# Patient Record
Sex: FEMALE | Race: WHITE
[De-identification: names, ages, dates, MRNs, and addresses within clinical notes are randomized per-mention and may not be internally consistent; named-entity substitution may affect disease eponyms.]

---

## 2017-09-19 ENCOUNTER — HOSPITAL ENCOUNTER (EMERGENCY)
Dept: HOSPITAL 74 - JER | Age: 22
LOS: 1 days | Discharge: HOME | End: 2017-09-20
Payer: COMMERCIAL

## 2017-09-19 VITALS — BODY MASS INDEX: 28.1 KG/M2

## 2017-09-19 DIAGNOSIS — Z79.4: ICD-10-CM

## 2017-09-19 DIAGNOSIS — E10.9: ICD-10-CM

## 2017-09-19 DIAGNOSIS — L02.415: Primary | ICD-10-CM

## 2017-09-19 DIAGNOSIS — Z96.41: ICD-10-CM

## 2017-09-19 DIAGNOSIS — J45.909: ICD-10-CM

## 2017-09-20 VITALS — SYSTOLIC BLOOD PRESSURE: 116 MMHG | HEART RATE: 100 BPM | DIASTOLIC BLOOD PRESSURE: 73 MMHG

## 2017-09-20 VITALS — TEMPERATURE: 98.5 F

## 2017-09-20 LAB
ALBUMIN SERPL-MCNC: 3.5 G/DL (ref 3.4–5)
ALP SERPL-CCNC: 79 U/L (ref 45–117)
ALT SERPL-CCNC: 12 U/L (ref 12–78)
ANION GAP SERPL CALC-SCNC: 8 MMOL/L (ref 8–16)
AST SERPL-CCNC: 8 U/L (ref 15–37)
BASOPHILS # BLD: 0.5 % (ref 0–2)
BILIRUB SERPL-MCNC: 0.6 MG/DL (ref 0.2–1)
CALCIUM SERPL-MCNC: 8.7 MG/DL (ref 8.5–10.1)
CO2 SERPL-SCNC: 28 MMOL/L (ref 21–32)
CREAT SERPL-MCNC: 0.7 MG/DL (ref 0.55–1.02)
DEPRECATED RDW RBC AUTO: 12.9 % (ref 11.6–15.6)
EOSINOPHIL # BLD: 2.4 % (ref 0–4.5)
GLUCOSE SERPL-MCNC: 245 MG/DL (ref 74–106)
MCH RBC QN AUTO: 29.2 PG (ref 25.7–33.7)
MCHC RBC AUTO-ENTMCNC: 33.7 G/DL (ref 32–36)
MCV RBC: 86.7 FL (ref 80–96)
NEUTROPHILS # BLD: 72.9 % (ref 42.8–82.8)
PLATELET # BLD AUTO: 236 K/MM3 (ref 134–434)
PMV BLD: 9.7 FL (ref 7.5–11.1)
PROT SERPL-MCNC: 7.1 G/DL (ref 6.4–8.2)
WBC # BLD AUTO: 18.1 K/MM3 (ref 4–10)

## 2017-09-20 NOTE — PDOC
*Physical Exam





- Vital Signs


 Last Vital Signs











Temp Pulse Resp BP Pulse Ox


 


 98.5 F   100 H  18   116/73   100 


 


 09/20/17 03:45  09/20/17 01:05  09/20/17 01:05  09/20/17 01:05  09/20/17 01:05














ED Treatment Course





- LABORATORY


CBC & Chemistry Diagram: 


 09/20/17 01:30





 09/20/17 01:30





- ADDITIONAL ORDERS


Additional order review: 


 Laboratory  Results











  09/20/17





  01:30


 


Sodium  136


 


Potassium  3.9


 


Chloride  100


 


Carbon Dioxide  28


 


Anion Gap  8


 


BUN  8  D


 


Creatinine  0.7


 


Creat Clearance w eGFR  > 60


 


Random Glucose  245 H D


 


Calcium  8.7


 


Total Bilirubin  0.6  D


 


AST  8 L


 


ALT  12  D


 


Alkaline Phosphatase  79


 


Total Protein  7.1


 


Albumin  3.5








 











  09/20/17





  01:30


 


RBC  4.55


 


MCV  86.7


 


MCHC  33.7


 


RDW  12.9


 


MPV  9.7


 


Neutrophils %  72.9


 


Lymphocytes %  19.7


 


Monocytes %  4.5


 


Eosinophils %  2.4


 


Basophils %  0.5














- Medications


Given in the ED: 


ED Medications














Discontinued Medications














Generic Name Dose Route Start Last Admin





  Trade Name Freq  PRN Reason Stop Dose Admin


 


Piperacillin Sod/Tazobactam  50 mls @ 100 mls/hr 09/20/17 01:12 09/20/17 01:45





  Sod 3.375 gm/ Dextrose  IVPB 09/20/17 01:41  100 mls/hr





  ONCE ONE   Administration





  Protocol   


 


Trimethoprim/Sulfamethoxazole  1 each 09/20/17 01:12 09/20/17 01:45





  Bactrim Ds -  PO 09/20/17 01:13  1 each





  ONCE ONE   Administration














*DC/Admit/Observation/Transfer


Diagnosis at time of Disposition: 


 Abscess of right thigh





- Discharge Dispostion


Disposition: HOME


Condition at time of disposition: Stable


Admit: No





- Prescriptions


Prescriptions: 


Clindamycin [Cleocin -] 300 mg PO TID #30 capsule


Ibuprofen 800 mg PO TID #30 tablet


Oxycodone HCl/Acetaminophen [Percocet 5-325 mg Tablet] 1 - 2 tab PO Q6H #20 

tablet MDD 4





- Referrals


Referrals: 


Daja Lynch MD [Primary Care Provider] - 





- Patient Instructions


Printed Discharge Instructions:  DI for Wound Infection





- Post Discharge Activity


Work/School Note:  Back to Work

## 2017-09-20 NOTE — PDOC
History of Present Illness





- General


History Source: Patient


Exam Limitations: No Limitations





- History of Present Illness


Initial Comments: 





17 01:38


Patient is a 21 year old female with a significant past medical history of 

asthma and diabetes, who presents to the ED with complaining of right inner 

thigh pustule that began developing 3 days ago.  Patient report noticing the 

pustule on her right inner thigh 3 days ago with pain.  She reports 

intermittent drainage from the pustule.  Patient states she has been to the ED 

for this complaint once before.  





Denies fever, chills. Denies chest pain, SOB. Denies nausea, vomiting. Denies 

contact with sick individuals. Denies any out of state travel. Denies any other 

symptoms. 


Allergies: None 


Social history: No smoking. No alcohol. No illicit drugs. 


Surgical history: None


PMD: None








<Julian Cooper - Last Filed: 17 01:38>





<Lavinia Rene - Last Filed: 17 01:41>





<Ran Griggs - Last Filed: 17 03:54>





- General


Chief Complaint: Wound Infection


Stated Complaint: INFECTION


Time Seen by Provider: 17 01:13





Past History





<Julian Cooper - Last Filed: 17 01:38>





- Past Medical History


Asthma: Yes


Diabetes: Yes (IDDM, PUMP)





- Reproductive History


 (#): 1


Para: 0





- Immunization History


Immunization Up to Date: Yes





- Suicide/Smoking/Psychosocial Hx


Smoking History: Never smoked


Hx Alcohol Use: No


Drug/Substance Use Hx: No


Substance Use Type: None





<Lavinia Rene - Last Filed: 17 01:41>





<Ran Griggs - Last Filed: 17 03:54>





- Past Medical History


Allergies/Adverse Reactions: 


 Allergies











Allergy/AdvReac Type Severity Reaction Status Date / Time


 


No Known Allergies Allergy   Verified 16 21:28











Home Medications: 


Ambulatory Orders





Insulin Pump Cartridge [Cartridge Stamped] 1 each SQ DAILY 14 


Clindamycin [Cleocin -] 300 mg PO TID #30 capsule 17 


Ibuprofen 800 mg PO TID #30 tablet 17 


Oxycodone HCl/Acetaminophen [Percocet 5-325 mg Tablet] 1 - 2 tab PO Q6H #20 

tablet MDD 4 17 











**Review of Systems





- Review of Systems


Able to Perform ROS?: Yes


Comments:: 





17 01:39


CONSTITUTIONAL: 


Absent: fever, chills, diaphoresis, generalized weakness, malaise, loss of 

appetite


HEENT: 


Absent: rhinorrhea, nasal congestion, throat pain, throat swelling, difficulty 

swallowing, mouth swelling, ear pain, eye pain, visual Changes


CARDIOVASCULAR: 


Absent: chest pain, syncope, palpitations, irregular heart rate, lightheadedness

, peripheral edema


RESPIRATORY: 


Absent: cough, shortness of breath, dyspnea with exertion, orthopnea, wheezing, 

stridor, hemoptysis


GASTROINTESTINAL:


Absent: abdominal pain, abdominal distension, nausea, vomiting, diarrhea, 

constipation, melena, hematochezia


GENITOURINARY: 


Absent: dysuria, frequency, urgency, hesitancy, hematuria, flank pain, genital 

pain


MUSCULOSKELETAL: 


Absent: myalgia, arthralgia, joint swelling


SKIN: + left leg Pustules 


Absent: rash, itching, pallor


HEMATOLOGIC/IMMUNOLOGIC: 


Absent: easy bleeding, easy bruising, lymphadenopathy, frequent infections


ENDOCRINE:


Absent: unexplained weight gain, unexplained weight loss, heat intolerance, 

cold intolerance


NEUROLOGIC: 


Absent: headache, focal weakness or paresthesias, dizziness, unsteady gait, 

seizure, mental status changes, bladder or bowel incontinence


PSYCHIATRIC: 


Absent: anxiety, depression, suicidal or homicidal ideation, hallucinations.





All Other Systems: Reviewed and Negative





<Julian Cooper - Last Filed: 17 01:38>





*Physical Exam





- Vital Signs


 Last Vital Signs











Temp Pulse Resp BP Pulse Ox


 


 98.7 F   100 H  18   116/73   100 


 


 17 01:05  17 01:05  17 01:05  17 01:05  17 01:05














- Physical Exam


Comments: 





17 01:39


GENERAL:


Well developed, well nourished. Awake and alert. No acute distress.


HEENT:


Normocephalic, atraumatic. PERRLA, EOMI. No conjunctival pallor. Sclera are non-

icteric. Moist mucous membranes. Oropharynx is clear.


NECK: Supple. Full ROM. No JVD. Carotid pulses 2+ and symmetric, without 

bruits. No thyromegaly. No lymphadenopathy.


CARDIOVASCULAR:


Regular rate and rhythm. No murmurs, rubs, or gallops. Distal pulses are 2+ and 

symmetric. 


PULMONARY: 


No evidence of respiratory distress. Lungs clear to auscultation bilaterally. 

No wheezing, rales or rhonchi.


ABDOMINAL:


Soft. Non-tender. Non-distended. No rebound or guarding. No organomegaly. 

Normoactive bowel sounds. 


MUSCULOSKELETAL 


Normal range of motion at all joints. No bony deformities or tenderness. No CVA 

tenderness.


EXTREMITIES: +Inner right thigh 2 cm pustule. 


No cyanosis. No clubbing. No edema. No calf tenderness.


SKIN: Warm and dry. Normal capillary refill. No rashes. No jaundice. 


NEUROLOGICAL: Alert, awake, appropriate. Cranial nerves 2-12 intact. No 

deficits to light touch and temperature in face, upper extremities and lower 

extremities. No motor deficits in the in face, upper extremities and lower 

extremities. Normoreflexic in the upper and lower extremities. Normal speech. 

Toes are down-going bilaterally. Gait is normal without ataxia.


PSYCHIATRIC: 


Cooperative. Good eye contact. Appropriate mood and affect.








<Julian Cooper - Last Filed: 17 01:38>





- Vital Signs


 Last Vital Signs











Temp Pulse Resp BP Pulse Ox


 


 98.7 F   100 H  18   116/73   100 


 


 17 01:05  17 01:05  17 01:05  17 01:05  17 01:05














<Lavinia Rene - Last Filed: 17 01:41>





- Vital Signs


 Last Vital Signs











Temp Pulse Resp BP Pulse Ox


 


 98.5 F   100 H  18   116/73   100 


 


 17 03:45  17 01:05  17 01:05  17 01:05  17 01:05














<Ran Griggs - Last Filed: 17 03:54>





ED Treatment Course





- LABORATORY


CBC & Chemistry Diagram: 


 17 01:30





 17 01:30





<Julian Cooper - Last Filed: 17 01:38>





- LABORATORY


CBC & Chemistry Diagram: 


 17 01:30





 17 01:30





<Lavinia Rene - Last Filed: 17 01:41>





- LABORATORY


CBC & Chemistry Diagram: 


 17 01:30





 17 01:30





- ADDITIONAL ORDERS


Additional order review: 


 Laboratory  Results











  17





  01:30


 


Sodium  136


 


Potassium  3.9


 


Chloride  100


 


Carbon Dioxide  28


 


Anion Gap  8


 


BUN  8  D


 


Creatinine  0.7


 


Creat Clearance w eGFR  > 60


 


Random Glucose  245 H D


 


Calcium  8.7


 


Total Bilirubin  0.6  D


 


AST  8 L


 


ALT  12  D


 


Alkaline Phosphatase  79


 


Total Protein  7.1


 


Albumin  3.5








 











  17





  01:30


 


RBC  4.55


 


MCV  86.7


 


MCHC  33.7


 


RDW  12.9


 


MPV  9.7


 


Neutrophils %  72.9


 


Lymphocytes %  19.7


 


Monocytes %  4.5


 


Eosinophils %  2.4


 


Basophils %  0.5














- Medications


Given in the ED: 


ED Medications














Discontinued Medications














Generic Name Dose Route Start Last Admin





  Trade Name Freq  PRN Reason Stop Dose Admin


 


Piperacillin Sod/Tazobactam  50 mls @ 100 mls/hr 17 01:12 17 01:45





  Sod 3.375 gm/ Dextrose  IVPB 17 01:41  100 mls/hr





  ONCE ONE   Administration





  Protocol   


 


Trimethoprim/Sulfamethoxazole  1 each 17 01:12 17 01:45





  Bactrim Ds -  PO 17 01:13  1 each





  ONCE ONE   Administration














<Ran Griggs - Last Filed: 17 03:54>





Medical Decision Making





- Medical Decision Making





17 01:41


20 yo female IDDM p/w with rt inner thigh pustule that starting to drain


afebrile here


plan IV antibiotics/labs





<Lavinia eRne - Last Filed: 17 01:41>





*DC/Admit/Observation/Transfer





- Attestations


Scribe Attestion: 





17 01:39





Documentation prepared by Julian Cooper, acting as medical scribe for Lavinia Rene MD/DO.





<Julian Cooper - Last Filed: 17 01:38>





<Lavinia Rene - Last Filed: 17 01:41>





- Discharge Dispostion


Admit: No





<Ran Griggs - Last Filed: 17 03:54>


Diagnosis at time of Disposition: 


 Abscess of right thigh





- Discharge Dispostion


Disposition: HOME


Condition at time of disposition: Stable





- Prescriptions


Prescriptions: 


Clindamycin [Cleocin -] 300 mg PO TID #30 capsule


Ibuprofen 800 mg PO TID #30 tablet


Oxycodone HCl/Acetaminophen [Percocet 5-325 mg Tablet] 1 - 2 tab PO Q6H #20 

tablet MDD 4





- Patient Instructions


Printed Discharge Instructions:  DI for Wound Infection

## 2018-02-05 ENCOUNTER — HOSPITAL ENCOUNTER (EMERGENCY)
Dept: HOSPITAL 74 - JERFT | Age: 23
Discharge: HOME | End: 2018-02-05
Payer: COMMERCIAL

## 2018-02-05 VITALS — HEART RATE: 88 BPM | TEMPERATURE: 98.3 F

## 2018-02-05 VITALS — BODY MASS INDEX: 26.9 KG/M2

## 2018-02-05 DIAGNOSIS — Z79.4: ICD-10-CM

## 2018-02-05 DIAGNOSIS — E10.9: ICD-10-CM

## 2018-02-05 DIAGNOSIS — Z96.41: ICD-10-CM

## 2018-02-05 DIAGNOSIS — Z48.02: Primary | ICD-10-CM

## 2018-02-05 NOTE — PDOC
Rapid Medical Evaluation


Time Seen by Provider: 02/05/18 17:35


Medical Evaluation: 


 Allergies











Allergy/AdvReac Type Severity Reaction Status Date / Time


 


No Known Allergies Allergy   Verified 02/05/18 17:35











02/05/18 17:36


The patient presents with a chief complaint of: had liposuction in the ED on 1/2 /18. Presents for suture removal in her legs. States that the sites are 

painful. 


I have performed a brief in-person evaluation of this patient;


Pertinent physical exam findings: ambulatory, in no respiratory distress, 

simple interrupted sutures on L leg, R lower leg and R upper leg. No signs of 

infection. afebrile, VSS


 I have ordered the following: nothing


The patient will proceed to the ED for further evaluation.

## 2018-02-05 NOTE — PDOC
Suture Removal/Wound Check HPI





- History of Present Illness


Chief Complaint: Suture/Staple Removal (other)


Stated Complaint: PAIN


Time Seen by Provider: 18 17:35


History Source: Yes: Patient


Exam Limitations: Yes: No Limitations


Treated at: Other ED ([place])





- Previous ED Treatment


Type of procedure performed on last visit: Yes: Other (liposuction breast 

implants)





Past History





- Past Medical History


Allergies/Adverse Reactions: 


 Allergies











Allergy/AdvReac Type Severity Reaction Status Date / Time


 


No Known Allergies Allergy   Verified 18 17:35











Home Medications: 


Ambulatory Orders





Insulin Pump Cartridge [Cartridge Stamped] 1 each SQ DAILY 14 








Asthma: Yes


COPD: No


DVT: No


Diabetes: Yes (IDDM, PUMP)





- Surgical History


Abdominal Surgery: Yes (LIPOSUCTION)





- Reproductive History


 (#): 1


Para: 0





- Immunization History


Immunization Up to Date: Yes





- Suicide/Smoking/Psychosocial Hx


Smoking History: Never smoked


Have you smoked in the past 12 months: No


Information on smoking cessation initiated: No


Hx Alcohol Use: No


Drug/Substance Use Hx: No


Substance Use Type: None





Suture Removal/Wound Check PE





- Physical Exam


Laceration/Wound Check Symptoms: reports: Pain.  denies: Discharge, Bleeding


Comments: 





18 19:03


bilateral legs with simple interrupted sutures to knee, posterior thigh, 

posterior knee, bilateral elbows and bikini line. 


no redness


Current Severity Level: Mild


Maximum Severity Level: Moderate


Pain Localization: Diffuse (legs, back of legs)





*Review of Systems





- Review of Systems


Able to Perform ROS?: Yes


Constitutional: No: Symptoms Reported


HEENTM: No: Symptoms Reported


Respiratory: No: Symptoms reported


Cardiac (ROS): No: Symptoms Reported


ABD/GI: No: Symptoms Reported


: No: Symptoms Reported


Musculoskeletal: No: Symptoms Reported


Integumentary: Yes: Symptoms Reported


Neurological: No: Symptoms reported





Procedures





- Additional Procedures


Progress: 





18 19:05


suture removal to legs, bikini, bilateral elbows 





Medical Decision Making





- Medical Decision Making





18 18:58


cc: here for suture removal s/p liposuction in [place] on 2018. pt has varies sites of sutures


pt has no fever no chills , feels soreness











*DC/Admit/Observation/Transfer


Diagnosis at time of Disposition: 


 Visit for suture removal








- Discharge Dispostion


Disposition: HOME


Condition at time of disposition: Good





- Referrals


Referrals: 


Daja Lynch MD [Primary Care Provider] - 





- Patient Instructions


Printed Discharge Instructions:  DI for Suture Removal


Additional Instructions: 


wash areas with soap and water as per regular routine 


follow up with your primary care doctor for any concerns 





- Post Discharge Activity

## 2018-11-30 ENCOUNTER — HOSPITAL ENCOUNTER (EMERGENCY)
Dept: HOSPITAL 74 - JERFT | Age: 23
Discharge: HOME | End: 2018-11-30
Payer: COMMERCIAL

## 2018-11-30 VITALS — SYSTOLIC BLOOD PRESSURE: 99 MMHG | DIASTOLIC BLOOD PRESSURE: 56 MMHG | TEMPERATURE: 97.8 F | HEART RATE: 83 BPM

## 2018-11-30 VITALS — BODY MASS INDEX: 26.5 KG/M2

## 2018-11-30 DIAGNOSIS — K08.89: Primary | ICD-10-CM

## 2018-11-30 DIAGNOSIS — K01.1: ICD-10-CM

## 2018-11-30 NOTE — PDOC
History of Present Illness





- General


Chief Complaint: Pain


Stated Complaint: TOOTHACHE


Time Seen by Provider: 18 14:42


History Source: Patient


Exam Limitations: No Limitations





- History of Present Illness


Initial Comments: 





18 18:54


pt c/o pain to right side jaw top and bottom wisdom teeth impacted for 2-3 

weeks. Pt has no fever 


Severity: mild





Past History





- Past Medical History


Allergies/Adverse Reactions: 


 Allergies











Allergy/AdvReac Type Severity Reaction Status Date / Time


 


No Known Allergies Allergy   Verified 18 14:42











Home Medications: 


Ambulatory Orders





Insulin Pump Cartridge [Cartridge Stamped] 1 each SQ DAILY 14 


Ibuprofen 800 mg PO TID PRN #21 tablet 18 


Penicillin V Potassium [Pen Vee K -] 250 mg PO QID #28 tablet 18 








Asthma: Yes


COPD: No


DVT: No


Diabetes: Yes (IDDM, PUMP)





- Surgical History


Abdominal Surgery: Yes (LIPOSUCTION)





- Reproductive History


 (#): 1


Para: 0





- Immunization History


Immunization Up to Date: Yes





- Suicide/Smoking/Psychosocial Hx


Smoking History: Never smoked


Have you smoked in the past 12 months: No


Hx Alcohol Use: No


Drug/Substance Use Hx: No


Substance Use Type: None





*Physical Exam





- Vital Signs


 Last Vital Signs











Temp Pulse Resp BP Pulse Ox


 


 97.8 F   83   16   99/56 L  98 


 


 18 14:43  18 14:43  18 14:43  18 14:43  18 14:43














- Physical Exam


General Appearance: Yes: Nourished, Appropriately Dressed


HEENT: positive: EOMI, JEAN CARLOS, TMs Normal, Other (bilteral impacted upper and 

lower wisdom teeth right side, neg abscess, neg erythema, ttpm FROM of the jaw )


Extremity: positive: Normal Capillary Refill, Normal Inspection


Integumentary: positive: Normal Color, Dry, Warm





Moderate Sedation





- Procedure Monitoring


Vital Signs: 


Procedure Monitoring Vital Signs











Temperature  97.8 F   18 14:43


 


Pulse Rate  83   18 14:43


 


Respiratory Rate  16   18 14:43


 


Blood Pressure  99/56 L  18 14:43


 


O2 Sat by Pulse Oximetry (%)  98   18 14:43











*DC/Admit/Observation/Transfer


Diagnosis at time of Disposition: 


 Pain, dental, Impacted teeth








- Discharge Dispostion


Disposition: HOME


Condition at time of disposition: Good





- Prescriptions


Prescriptions: 


Ibuprofen 800 mg PO TID PRN #21 tablet


 PRN Reason: Pain


Penicillin V Potassium [Pen Vee K -] 250 mg PO QID #28 tablet





- Referrals


[facility]: 


[facility]


Daja Lynch MD [Primary Care Provider] - 





- Patient Instructions


Additional Instructions: 


follow up with your dentist call today to make appointment 


take the medication as prescribed


avoid any hot or cold beverages or food as this can make pain worse


apply a warm compress to the right side of your face for 20 minutes every 

couple of hours 


gargle with warm salt water 4-5 times a day 





- Post Discharge Activity

## 2018-11-30 NOTE — PDOC
Rapid Medical Evaluation


Time Seen by Provider: 11/30/18 14:42


Medical Evaluation: 


 Allergies











Allergy/AdvReac Type Severity Reaction Status Date / Time


 


No Known Allergies Allergy   Verified 02/05/18 17:35











11/30/18 14:42





I have performed a brief in-person evaluation of this patient.





The patient presents with a chief complaint of:R toothache x 3 days. H/o IDDM





Pertinent physical exam findings:deferred to [facility]





I have ordered the following:nothing





The patient will proceed to the ED for further evaluation.


 





**Discharge Disposition





- Diagnosis


 Pain, dental








- Referrals





- Patient Instructions





- Post Discharge Activity

## 2019-09-28 ENCOUNTER — HOSPITAL ENCOUNTER (EMERGENCY)
Dept: HOSPITAL 74 - JER | Age: 24
LOS: 1 days | Discharge: HOME | End: 2019-09-29
Payer: COMMERCIAL

## 2019-09-28 VITALS — BODY MASS INDEX: 24.4 KG/M2

## 2019-09-28 DIAGNOSIS — Z98.890: ICD-10-CM

## 2019-09-28 DIAGNOSIS — Z79.4: ICD-10-CM

## 2019-09-28 DIAGNOSIS — Z96.41: ICD-10-CM

## 2019-09-28 DIAGNOSIS — N61.0: Primary | ICD-10-CM

## 2019-09-28 DIAGNOSIS — T81.31XA: ICD-10-CM

## 2019-09-28 DIAGNOSIS — J45.909: ICD-10-CM

## 2019-09-28 DIAGNOSIS — E10.9: ICD-10-CM

## 2019-09-28 LAB
BASOPHILS # BLD: 0.3 % (ref 0–2)
DEPRECATED RDW RBC AUTO: 13.7 % (ref 11.6–15.6)
EOSINOPHIL # BLD: 5.8 % (ref 0–4.5)
HCT VFR BLD CALC: 37.3 % (ref 32.4–45.2)
HGB BLD-MCNC: 12.5 GM/DL (ref 10.7–15.3)
LYMPHOCYTES # BLD: 42.7 % (ref 8–40)
MCH RBC QN AUTO: 29.5 PG (ref 25.7–33.7)
MCHC RBC AUTO-ENTMCNC: 33.4 G/DL (ref 32–36)
MCV RBC: 88.2 FL (ref 80–96)
MONOCYTES # BLD AUTO: 7.5 % (ref 3.8–10.2)
NEUTROPHILS # BLD: 43.7 % (ref 42.8–82.8)
PLATELET # BLD AUTO: 331 K/MM3 (ref 134–434)
PMV BLD: 9.6 FL (ref 7.5–11.1)
RBC # BLD AUTO: 4.22 M/MM3 (ref 3.6–5.2)
WBC # BLD AUTO: 8.6 K/MM3 (ref 4–10)

## 2019-09-28 PROCEDURE — 3E0333Z INTRODUCTION OF ANTI-INFLAMMATORY INTO PERIPHERAL VEIN, PERCUTANEOUS APPROACH: ICD-10-PCS | Performed by: EMERGENCY MEDICINE

## 2019-09-28 PROCEDURE — 3E033NZ INTRODUCTION OF ANALGESICS, HYPNOTICS, SEDATIVES INTO PERIPHERAL VEIN, PERCUTANEOUS APPROACH: ICD-10-PCS | Performed by: EMERGENCY MEDICINE

## 2019-09-28 NOTE — PDOC
History of Present Illness





- General


History Source: Patient


Exam Limitations: No Limitations





- History of Present Illness


Initial Comments: 





19 23:14


Patient is a 23 year old female with h/o DM type I, liposuction, breast 

augmentation with recent breast reduction on 19 in the DR c/o b/l breast 

pain, worse on the right.  Patient states started to have pain yesterday, with 

discharge on the right side, swelling.  States has been taking motrin 800mg 

with no relief of symptoms, last dose was 6 PM.  Was on antibx post surgery 

until 19 not currently on antibx.  Denies fever, chills, nausea, vomiting.

  Has been monitoring bs and last was at 9pm, .





PMHX:  as above


PSOCHX:  neg cig, drug, etoh'


ALL:  NKDA





GENERAL/CONSTITUTIONAL: No fever or chills. No weakness. No weight change.


HEAD, EYES, EARS, NOSE AND THROAT: No change in vision. No ear pain or 

discharge. No sore throat.


CARDIOVASCULAR: No chest pain or shortness of breath.


RESPIRATORY: No cough, wheezing, or hemoptysis.


GASTROINTESTINAL: No nausea, vomiting, diarrhea or constipation. No rectal 

bleeding.


GENITOURINARY: No dysuria, frequency, or change in urination.


MUSCULOSKELETAL: No joint or muscle swelling or pain. No neck or back pain.


SKIN AND BREASTS: No rash or easy bruising.


NEUROLOGIC: No headache, vertigo, loss of consciousness, or loss of sensation.


PSYCHIATRIC: No depression or anxiety.


ENDOCRINE: No increased thirst. No abnormal weight change.


HEMATOLOGIC/LYMPHATIC: No anemia, easy bleeding, or history of blood clots.


ALLERGIC/IMMUNOLOGIC: No hives or skin allergy. No latex allergy.








GENERAL: The patient is awake, alert, and fully oriented, in no acute distress.


HEAD: Normal with no signs of trauma.


EYES: Pupils equal, round and reactive to light, extraocular movements intact, 

sclera anicteric, conjunctiva clear.


ENT: Ears normal, nares patent, oropharynx clear without exudates.  Moist 

mucous membranes.


NECK: Normal range of motion, supple without lymphadenopathy, JVD, or masses.


LUNGS: Breath sounds equal, clear to auscultation bilaterally.  No wheezes, and 

no crackles.


HEART: Regular rate and rhythm, normal S1 and S2 without murmur, rub.


ABDOMEN: Soft, nontender, normoactive bowel sounds.  No guarding, no rebound.  

No masses.


EXTREMITIES: Normal range of motion, no edema.  No clubbing or cyanosis. No 

cords, erythema, or tenderness.


NEUROLOGICAL: Cranial nerves II through XII grossly intact.  Normal speech, 

normal gait.


PSYCH: Normal mood, normal affect.


SKIN: Warm, Dry, normal turgor, no rashes or lesions noted.


BREAST:  (+) surgical scars b/l breast, right breast mild swelling, tenderness 

to palp, distal portion of midline scar with necrotic tissue no discharge 

noted. 











<Abdifatah Wallace - Last Filed: 19 01:57>





<Rebecca Lima - Last Filed: 19 06:11>





- General


Chief Complaint: Pain


Stated Complaint: BREAST PAIN


Time Seen by Provider: 19 22:24





Past History





- Past Medical History


Asthma: Yes


COPD: No


DVT: No


Diabetes: Yes (IDDM, PUMP)





- Surgical History


Abdominal Surgery: Yes (LIPOSUCTION)





- Reproductive History


 (#): 1


Para: 0





- Immunization History


Immunization Up to Date: Yes





- Psycho Social/Smoking Cessation Hx


Smoking History: Never smoked


Have you smoked in the past 12 months: No


Hx Alcohol Use: No


Drug/Substance Use Hx: No


Substance Use Type: None





<Abdifatah Wallace - Last Filed: 19 01:57>





<Rebecca Lima - Last Filed: 19 06:11>





- Past Medical History


Allergies/Adverse Reactions: 


 Allergies











Allergy/AdvReac Type Severity Reaction Status Date / Time


 


No Known Allergies Allergy   Verified 19 00:38











Home Medications: 


Ambulatory Orders





Insulin Pump Cartridge [Cartridge Stamped] 1 each SQ DAILY 14 


Clindamycin [Cleocin -] 300 mg PO Q6HPO #28 capsule 19 











*Physical Exam





- Vital Signs


 Last Vital Signs











Temp Pulse Resp BP Pulse Ox


 


 98.6 F   81   19   111/63   100 


 


 19 21:05  19 21:05  19 21:05  19 21:05  19 21:05














<Abdifatah Wallace - Last Filed: 19 01:57>





- Vital Signs


 Last Vital Signs











Temp Pulse Resp BP Pulse Ox


 


 98.6 F   81   19   111/63   100 


 


 19 21:05  19 21:05  19 21:05  19 21:05  19 21:05














<LimaRebecca - Last Filed: 19 06:11>





ED Treatment Course





- LABORATORY


CBC & Chemistry Diagram: 


 19 23:45





 19 01:11





<Abdifatah Wallace - Last Filed: 19 01:57>





- LABORATORY


CBC & Chemistry Diagram: 


 19 23:45





 19 01:11





- ADDITIONAL ORDERS


Additional order review: 


 Laboratory  Results











  19





  01:11 23:45 23:40


 


Sodium  144  Cancelled 


 


Potassium  4.9  Cancelled 


 


Chloride  111 H  Cancelled 


 


Carbon Dioxide  27  Cancelled 


 


Anion Gap  6 L  Cancelled 


 


BUN  13.7  Cancelled 


 


Creatinine  0.8  Cancelled 


 


Est GFR (CKD-EPI)AfAm  118.76  Cancelled 


 


Est GFR (CKD-EPI)NonAf  102.47  Cancelled 


 


Random Glucose  207 H  Cancelled 


 


Calcium  8.1 L  Cancelled 


 


Total Bilirubin  0.2  Cancelled 


 


AST  30  Cancelled 


 


ALT  20  Cancelled 


 


Alkaline Phosphatase  57  Cancelled 


 


Total Protein  5.9 L  Cancelled 


 


Albumin  3.0 L  Cancelled 


 


Urine HCG, Qual    Negative








 











  19





  23:45


 


RBC  4.22


 


MCV  88.2


 


MCHC  33.4


 


RDW  13.7


 


MPV  9.6


 


Neutrophils %  43.7  D


 


Lymphocytes %  42.7 H D


 


Monocytes %  7.5


 


Eosinophils %  5.8 H D


 


Basophils %  0.3














- Medications


Given in the ED: 


ED Medications














Discontinued Medications














Generic Name Dose Route Start Last Admin





  Trade Name Freq  PRN Reason Stop Dose Admin


 


Ketorolac Tromethamine  30 mg  19 00:54  19 00:58





  Toradol Injection -  IVPUSH  19 00:55  30 mg





  ONCE ONE   Administration





     





     





     





     


 


Morphine Sulfate  2 mg  19 23:23  19 23:55





  Morphine Injection -  IVPUSH  19 23:24  2 mg





  ONCE ONE   Administration





     





     





     





     


 


Sodium Chloride  1,000 ml  19 23:24  19 23:55





  Normal Saline -  IV  19 23:25  1,000 ml





  ONCE ONE   Administration





     





     





     





     














<Rebecca Lima - Last Filed: 19 06:11>





Medical Decision Making





- Medical Decision Making





19 23:14


Patient is a 23 year old female with h/o DM type I, liposuction, breast 

augmentation with recent breast reduction on 19 in the DR c/o b/l breast 

pain, worse on the right.  Patient states started to have pain yesterday, with 

discharge on the right side, swelling.  States has been taking motrin 800mg 

with no relief of symptoms, last dose was 6 PM.  Was on antibx post surgery 

until 19 not currently on antibx.  Denies fever, chills, nausea, vomiting.

  Has been monitoring bs and last was at 9pm, .





Insulin-dependent diabetic with recent surgery concerns for abscess on the 

right breasts.


Labs, CT scan with IV contrast.


Morphine for pain


Reassess








19 00:57


H and still complains of pain will give Toradol 30 mg IV.


No Acute findings on lab pending cmp.





19 01:56


wound culture sent








19 01:56


endorsed to follow CT scan








<Abdifatah Wallace - Last Filed: 19 01:57>





- Medical Decision Making








19 02:33


Patient Name: MICK QUEZADA


THIS IS A PRELIMINARY REPORT FROM IMAGING ON CALL


DATE OF SERVICE: 2019 01:58:44


IMAGES: [phone]


EXAM: CHEST CT WITH CONTRAST


HISTORY: Breast pain


COMPARISON: None.


FINDINGS:


Clear lungs


No pneumothorax, effusion, or mediastinal lymphadenopathy.


Small amount of residual thymic tissue within the anterior mediastinum


The aorta and pulmonary arterial branches are unremarkable.


The heart is normal in size without evidence of a pericardial effusion.


Survey of the upper abdomen demonstrates no acute abnormality.


There are bilateral saline breast implants.


No abnormal fluid collection to suggest an abscess


19 06:11


Home with clindamycin





<Rebecca Lima - Last Filed: 19 06:11>





Discharge





<Abdifatah Wallace - Last Filed: 19 01:57>





- Discharge Information


Problems reviewed: Yes





- Admission


No





<LimaRebecca harris - Last Filed: 19 06:11>





- Discharge Information


Clinical Impression/Diagnosis: 


 Cellulitis





Postoperative external wound disruption


Qualifiers:


 Encounter type: initial encounter Qualified Code(s): T81.31XA - Disruption of 

external operation (surgical) wound, not elsewhere classified, initial encounter





Condition: Stable





- Additional Discharge Information


Prescriptions: 


Clindamycin [Cleocin -] 300 mg PO Q6HPO #28 capsule





- Patient Discharge Instructions


Patient Printed Discharge Instructions:  DI for Cellulitis -- Adult

## 2019-09-29 VITALS — DIASTOLIC BLOOD PRESSURE: 61 MMHG | TEMPERATURE: 98.1 F | SYSTOLIC BLOOD PRESSURE: 111 MMHG | HEART RATE: 73 BPM

## 2019-09-29 LAB
ALBUMIN SERPL-MCNC: 3 G/DL (ref 3.4–5)
ALP SERPL-CCNC: 57 U/L (ref 45–117)
ALT SERPL-CCNC: 20 U/L (ref 13–61)
ANION GAP SERPL CALC-SCNC: 6 MMOL/L (ref 8–16)
AST SERPL-CCNC: 30 U/L (ref 15–37)
BILIRUB SERPL-MCNC: 0.2 MG/DL (ref 0.2–1)
BUN SERPL-MCNC: 13.7 MG/DL (ref 7–18)
CALCIUM SERPL-MCNC: 8.1 MG/DL (ref 8.5–10.1)
CHLORIDE SERPL-SCNC: 111 MMOL/L (ref 98–107)
CO2 SERPL-SCNC: 27 MMOL/L (ref 21–32)
CREAT SERPL-MCNC: 0.8 MG/DL (ref 0.55–1.3)
GLUCOSE SERPL-MCNC: 207 MG/DL (ref 74–106)
POTASSIUM SERPLBLD-SCNC: 4.9 MMOL/L (ref 3.5–5.1)
PROT SERPL-MCNC: 5.9 G/DL (ref 6.4–8.2)
SODIUM SERPL-SCNC: 144 MMOL/L (ref 136–145)

## 2022-10-20 ENCOUNTER — OFFICE (OUTPATIENT)
Dept: URBAN - METROPOLITAN AREA CLINIC 86 | Facility: CLINIC | Age: 27
Setting detail: OPHTHALMOLOGY
End: 2022-10-20
Payer: MEDICARE

## 2022-10-20 DIAGNOSIS — H40.003: ICD-10-CM

## 2022-10-20 PROBLEM — E11.9 DIABETES TYPE 2 NO RETINOPATHY: Status: ACTIVE | Noted: 2022-10-20

## 2022-10-20 PROCEDURE — 92020 GONIOSCOPY: CPT | Performed by: OPHTHALMOLOGY

## 2022-10-20 PROCEDURE — 92250 FUNDUS PHOTOGRAPHY W/I&R: CPT | Performed by: OPHTHALMOLOGY

## 2022-10-20 PROCEDURE — 92014 COMPRE OPH EXAM EST PT 1/>: CPT | Performed by: OPHTHALMOLOGY

## 2022-10-20 ASSESSMENT — PACHYMETRY
OD_CT_CORRECTION: -7
OS_CT_CORRECTION: -7
OS_CT_UM: 641
OD_CT_UM: 642

## 2022-10-20 ASSESSMENT — CONFRONTATIONAL VISUAL FIELD TEST (CVF)
OS_FINDINGS: FULL
OD_FINDINGS: FULL

## 2022-10-20 ASSESSMENT — VISUAL ACUITY
OD_BCVA: 20/25
OS_BCVA: 20/25

## 2024-12-05 ENCOUNTER — OFFICE (OUTPATIENT)
Dept: URBAN - METROPOLITAN AREA CLINIC 86 | Facility: CLINIC | Age: 29
Setting detail: OPHTHALMOLOGY
End: 2024-12-05
Payer: COMMERCIAL

## 2024-12-05 DIAGNOSIS — E11.9: ICD-10-CM

## 2024-12-05 DIAGNOSIS — H40.003: ICD-10-CM

## 2024-12-05 PROCEDURE — 92250 FUNDUS PHOTOGRAPHY W/I&R: CPT | Performed by: OPHTHALMOLOGY

## 2024-12-05 PROCEDURE — 92020 GONIOSCOPY: CPT | Performed by: OPHTHALMOLOGY

## 2024-12-05 PROCEDURE — 92014 COMPRE OPH EXAM EST PT 1/>: CPT | Performed by: OPHTHALMOLOGY

## 2024-12-05 ASSESSMENT — VISUAL ACUITY
OD_BCVA: 20/20
OS_BCVA: 20/30-

## 2024-12-05 ASSESSMENT — CONFRONTATIONAL VISUAL FIELD TEST (CVF)
OS_FINDINGS: FULL
OD_FINDINGS: FULL

## 2024-12-05 ASSESSMENT — PACHYMETRY
OS_CT_UM: 641
OD_CT_UM: 642
OS_CT_CORRECTION: -7
OD_CT_CORRECTION: -7

## 2025-01-30 ENCOUNTER — OFFICE (OUTPATIENT)
Dept: URBAN - METROPOLITAN AREA CLINIC 86 | Facility: CLINIC | Age: 30
Setting detail: OPHTHALMOLOGY
End: 2025-01-30
Payer: COMMERCIAL

## 2025-01-30 DIAGNOSIS — H40.003: ICD-10-CM

## 2025-01-30 PROCEDURE — 92083 EXTENDED VISUAL FIELD XM: CPT | Performed by: OPHTHALMOLOGY

## 2025-01-30 PROCEDURE — 99213 OFFICE O/P EST LOW 20 MIN: CPT | Performed by: OPHTHALMOLOGY

## 2025-01-30 PROCEDURE — 92133 CPTRZD OPH DX IMG PST SGM ON: CPT | Performed by: OPHTHALMOLOGY

## 2025-01-30 ASSESSMENT — PACHYMETRY
OS_CT_CORRECTION: -7
OS_CT_UM: 641
OD_CT_UM: 642
OD_CT_CORRECTION: -7

## 2025-01-30 ASSESSMENT — VISUAL ACUITY
OD_BCVA: F&F
OS_BCVA: F&F

## 2025-01-30 ASSESSMENT — CONFRONTATIONAL VISUAL FIELD TEST (CVF)
OS_FINDINGS: FULL
OD_FINDINGS: FULL

## 2025-01-30 ASSESSMENT — TONOMETRY
OD_IOP_MMHG: 20
OS_IOP_MMHG: 20